# Patient Record
Sex: MALE | Race: WHITE | NOT HISPANIC OR LATINO | URBAN - METROPOLITAN AREA
[De-identification: names, ages, dates, MRNs, and addresses within clinical notes are randomized per-mention and may not be internally consistent; named-entity substitution may affect disease eponyms.]

---

## 2020-12-08 ENCOUNTER — OFFICE VISIT (OUTPATIENT)
Dept: URBAN - METROPOLITAN AREA CLINIC 50 | Facility: CLINIC | Age: 50
End: 2020-12-08

## 2020-12-14 ENCOUNTER — DASHBOARD ENCOUNTERS (OUTPATIENT)
Age: 50
End: 2020-12-14

## 2020-12-14 ENCOUNTER — OFFICE VISIT (OUTPATIENT)
Dept: URBAN - METROPOLITAN AREA CLINIC 124 | Facility: CLINIC | Age: 50
End: 2020-12-14
Payer: MEDICARE

## 2020-12-14 ENCOUNTER — LAB OUTSIDE AN ENCOUNTER (OUTPATIENT)
Dept: URBAN - METROPOLITAN AREA CLINIC 124 | Facility: CLINIC | Age: 50
End: 2020-12-14

## 2020-12-14 DIAGNOSIS — R10.84 GENERALIZED ABDOMINAL PAIN: ICD-10-CM

## 2020-12-14 DIAGNOSIS — R93.89 ABNORMAL CT SCAN: ICD-10-CM

## 2020-12-14 PROBLEM — 129679001: Status: ACTIVE | Noted: 2020-12-14

## 2020-12-14 PROCEDURE — G8427 DOCREV CUR MEDS BY ELIG CLIN: HCPCS | Performed by: INTERNAL MEDICINE

## 2020-12-14 PROCEDURE — 99204 OFFICE O/P NEW MOD 45 MIN: CPT | Performed by: INTERNAL MEDICINE

## 2020-12-14 PROCEDURE — G8417 CALC BMI ABV UP PARAM F/U: HCPCS | Performed by: INTERNAL MEDICINE

## 2020-12-14 PROCEDURE — 1036F TOBACCO NON-USER: CPT | Performed by: INTERNAL MEDICINE

## 2020-12-14 PROCEDURE — G9903 PT SCRN TBCO ID AS NON USER: HCPCS | Performed by: INTERNAL MEDICINE

## 2020-12-14 PROCEDURE — G8482 FLU IMMUNIZE ORDER/ADMIN: HCPCS | Performed by: INTERNAL MEDICINE

## 2020-12-14 RX ORDER — PANTOPRAZOLE SODIUM 40 MG/1
1 TABLET TABLET, DELAYED RELEASE ORAL ONCE A DAY
Qty: 30 | Refills: 3 | OUTPATIENT
Start: 2020-12-14

## 2020-12-14 NOTE — HPI-TODAY'S VISIT:
Pt goes by "Isma" says he was not sure about what CT findings  meant. The pain fluctuates and pain somedays better than others. No abx given. Pt had hives in June and was dx with an "immune system". He tried otc zyrtec, This stopped on its own. Pt has started to have toes/wrists and fingers.  Pt works in [employer], , 2 kids. LIves in [place].  *A copy of this note will be sent to referring MD

## 2020-12-14 NOTE — HPI-TODAY'S VISIT:
49 yo male referred by DR Patti Soto for abd pain. Pt had pain in Sept with movment initially and then more recently pain with eating. CT with dudenitis and jejunitis suggesting inflammation or infection. Pt says pain is continuous but level of pain goes up and down. No n/v or temps. No weight loss. Appetite ok.
